# Patient Record
(demographics unavailable — no encounter records)

---

## 2024-10-08 NOTE — HISTORY OF PRESENT ILLNESS
[5] : 5 [Denies] : Denies [No] : No [Yes] : Yes [Declined] : Declined [Informed consent documented in EHR.] : Informed consent documented in EHR. [TB] : Tuberculosis screening [Hep acute panel] : Hepatitis acute panel [Total Hep B core AB] : total Hepatitis B Core antibody [de-identified] : LEFT SHOULDER [Left upper extremity] : Left upper extremity [24g] : 24g [Start Time: ___] : Medication Start Time: [unfilled] [End Time: ___] : Medication End Time: [unfilled] [Medication Name: ___] : Medication Name: [unfilled] [Total Amount Administered: ___] : Total Amount Administered: [unfilled] [IV discontinued. Intact. No signs or symptoms of IV complications noted. Time: ___] : IV discontinued. Intact. No signs or symptoms of IV complications noted. Time: [unfilled] [Patient  instructed to seek medical attention with signs and symptoms of adverse effects] : Patient  instructed to seek medical attention with signs and symptoms of adverse effects [Patient left unit in no acute distress] : Patient left unit in no acute distress [Medications administered as ordered and tolerated well.] : Medications administered as ordered and tolerated well. [de-identified] : 10:03AM [de-identified] : NEXT INFUSION- 11/5/24 AT 10:00AM

## 2024-11-05 NOTE — HISTORY OF PRESENT ILLNESS
[Denies] : Denies [No] : No [Yes] : Yes [Declined] : Declined [Informed consent documented in EHR.] : Informed consent documented in EHR. [TB] : Tuberculosis screening [Hep acute panel] : Hepatitis acute panel [Right upper extremity] : Right upper extremity [22g] : 22g [Start Time: ___] : Medication Start Time: [unfilled] [End Time: ___] : Medication End Time: [unfilled] [Medication Name: ___] : Medication Name: [unfilled] [Total Amount Administered: ___] : Total Amount Administered: [unfilled] [IV discontinued. Intact. No signs or symptoms of IV complications noted. Time: ___] : IV discontinued. Intact. No signs or symptoms of IV complications noted. Time: [unfilled] [Patient  instructed to seek medical attention with signs and symptoms of adverse effects] : Patient  instructed to seek medical attention with signs and symptoms of adverse effects [Patient left unit in no acute distress] : Patient left unit in no acute distress [Medications administered as ordered and tolerated well.] : Medications administered as ordered and tolerated well. [Blood drawn at time of visit] : Blood drawn at time of visit [de-identified] : 9:55am

## 2024-11-12 NOTE — DATA REVIEWED
[FreeTextEntry1] : Labs:  1/23 nl CMP, C3/4, ESR, UA, WBC 2.8, otherwise nl CBC, CRP 11 11/25/22 w/ TSH 15, dsDNA > 1000, ACL 1gM > 150, B2G IGA 57/ IGM > 150, phosphatidyl IGM 52 Chromatic 4.0, anti histone 4.1, +LAC (DRVVT only) persistent leukopenia 3.15 (high % neutrophils), nl IGG subsets, C3/4, CMP, CK UPCR 0.2, neg TPO/ TG, RF, ScL70, STAN, SSA/B, MPO, PR3, ANCA, ACE, CCP, IPEP, granulocyte antibodies and HCQ levels just above therapeutic range (should be > 1000- her value 1002)   Updated Labs: 2/22 low + DILLON 1:320H/N w/ low + dsDNA 13, + CAVi 39 (early SS antibody), nl C3/4 CK, SPEP, CBC  nl neg CCP, HLAB27, MADELIN, Scl70  1/22 low + DILLON 1:320H/N w/ low + dsDNA 13, C3 81 in past nl nl / C4 nl neg CCP, hep TPO, low + TG, B/C and QFT, vit D 26.   Diagnostics:  - US hands 3/22 negative for active synovitis but didn't have any swelling at the time of study! . MR knee L 10/21 mild thinning medial compartment otherwise neg   CTA chest 9/21 neg for PE, lymphadenopathy or ILD   CT Abd/ pelvis 9/21 gallbladder sludge neg otherwise-> US abd complete neg  Dexa 12/3/19: w/ most severe Tscore -1.5 at L2 w/ -1.4 at LFN with low risk fracture overall. No recent comparison available - when compared to 2011- 15% drop at spine..updated study needed now

## 2024-11-12 NOTE — PROCEDURE
[Today's Date:] : Date: [unfilled] [Risks] : risks [Benefits] : benefits [Alternatives] : alternatives [Consent Obtained] : written consent was obtained prior to the procedure and is detailed in the patient's record [Patient] : Prior to the start of the procedure a time out was taken and the identity of the patient was confirmed via name and date of birth with the patient. The correct site and the procedure to be performed were confirmed. The correct side was confirmed if applicable. The availability of the correct equipment was verified [Therapeutic] : therapeutic [#1 Site: ______] : #1 site identified in the [unfilled] [Alcohol] : alcohol [25 gauge 1 inch] : A 25 gauge 1 inch needle was used [Tolerated Well] : the patient tolerated the procedure well [No Complications] : there were no complications [Instructions Given] : handouts/patient instructions were given to patient [Patient Instructed to Call] : patient was instructed to call if redness at site, a decrease in range of motion or an increase in pain is noted after procedure. [de-identified] : 2cc 1% Lidocaine

## 2024-11-12 NOTE — REVIEW OF SYSTEMS
[Feeling Poorly] : feeling poorly [Feeling Tired] : feeling tired [Recent Weight Loss (___ Lbs)] : recent [unfilled] ~Ulb weight loss [Chest Pain] : chest pain [Heartburn] : heartburn [As Noted in HPI] : as noted in HPI [Arthralgias] : arthralgias [Joint Pain] : joint pain [Joint Swelling] : joint swelling [Joint Stiffness] : joint stiffness [Sleep Disturbances] : sleep disturbances [Anxiety] : anxiety [Depression] : depression [Negative] : Heme/Lymph [Fever] : no fever [Chills] : no chills [Dry Eyes] : no dryness of the eyes [Lower Ext Edema] : no lower extremity edema [Shortness Of Breath] : no shortness of breath [Cough] : no cough [Skin Lesions] : no skin lesions [Itching] : no itching [FreeTextEntry2] : marked improvement in energy [FreeTextEntry4] : Dry mouth r/t lyrica rx 2-3 tabs (q hs) with mouth guard routinely for bruxism- notes irritation cheilosis BL  [FreeTextEntry5] : Non-reproducible chest tightness, hx GERD on omeprazole. Previous: chest pain . L sided cw pain etiology unclear- intermittently pleuritic w/ hx pleural effusions- still painful.. routinely.- mild now [FreeTextEntry6] : Denies SOB, wheezing.  [FreeTextEntry7] :  On omeprazole daily. PREVIOUS VISIT: severe GERD and diarrhea w/ leflunomide- better OFF .   [FreeTextEntry8] : Slight increase in pro/cr ratio 0.3 since 11/23-> 3/24, asymptomatic- stable updated studies needed  [FreeTextEntry9] : Excellent lately... in past Intermittent b/l hand swelling, neuropathy and soreness, b/l shoulder pain. PREVIOUS VISIT: still getting intermittent swelling in hands and "periodic joint pain"- most severe in bl shoulder L>R.   [de-identified] : PREVIOUS VISIT: Itchy, blistering lesions; photosensitive arthralgias but no rash.- RESOLVED  [de-identified] : Mild intermittent paresthesias nothing today -STILL- minimal lately [de-identified] : Feels A/D better controlled on wellbutrin 300mg daily and improved w/ better sleep on current regimen [de-identified] : Stil on levothyroxine 150mg endo following TSH [id number], no recent TSH level in chart but pt report nl [de-identified] : RESOLVED -Lip swelling

## 2024-11-12 NOTE — PHYSICAL EXAM
[General Appearance - Alert] : alert [General Appearance - In No Acute Distress] : in no acute distress [General Appearance - Well Nourished] : well nourished [General Appearance - Well Developed] : well developed [General Appearance - Well-Appearing] : healthy appearing [Sclera] : the sclera and conjunctiva were normal [Outer Ear] : the ears and nose were normal in appearance [Oropharynx] : the oropharynx was normal [] : no respiratory distress [Respiration, Rhythm And Depth] : normal respiratory rhythm and effort [Exaggerated Use Of Accessory Muscles For Inspiration] : no accessory muscle use [Auscultation Breath Sounds / Voice Sounds] : lungs were clear to auscultation bilaterally [Heart Rate And Rhythm] : heart rate was normal and rhythm regular [Heart Sounds] : normal S1 and S2 [Heart Sounds Gallop] : no gallops [Murmurs] : no murmurs [Heart Sounds Pericardial Friction Rub] : no pericardial rub [Edema] : there was no peripheral edema [Cervical Lymph Nodes Enlarged Posterior Bilaterally] : posterior cervical [Cervical Lymph Nodes Enlarged Anterior Bilaterally] : anterior cervical [Supraclavicular Lymph Nodes Enlarged Bilaterally] : supraclavicular [Sensation] : the sensory exam was normal to light touch and pinprick [No Focal Deficits] : no focal deficits [Motor Exam] : the motor exam was normal [Oriented To Time, Place, And Person] : oriented to person, place, and time [Impaired Insight] : insight and judgment were intact [Affect] : the affect was normal [Abnormal Walk] : normal gait [Nail Clubbing] : no clubbing  or cyanosis of the fingernails [Musculoskeletal - Swelling] : no joint swelling seen [Motor Tone] : muscle strength and tone were normal [FreeTextEntry1] : Today no obvious synovitis;  Previous: Minimal swelling L 3rd MCP w/ mild ttp and L shoulder mild POM/ TTT but fROM

## 2024-11-12 NOTE — HISTORY OF PRESENT ILLNESS
[FreeTextEntry1] : 11/12/24  CC: SLE follow-up, spastic muscle pain, worst in neck Discomfort worse at night than morning Overall, patient says she's feeling better with treatment  Complains of throat pain 24 hours after infusion, lasting two days then resolves Sides of face up to temples become painful to touch Last infusion on Tuesday  Raynaud's very active, causes pain and numbness, painful when palpated Also complains of dry mouth - slightly moist but no pooling Other joints okay, "hips click a little"  Still taking 400mg Plaquenil, 300mg Bupropion, 100mg Pregabalin at night, 10mg Cyclobenzaprine at night No Prednisone  Reviewed labs C3, C4, ESR, CRP all basically stable  Bone Density 9/16/24 Most severe T-score -2.1 at spine, 6% decreased from 2yr ago -1.5 at femoral neck, 8% decreased FRAX 11%, 1.2% risk hip fracture    06/19/24 Finally better!! Switched to Benlysta infusions x 3 thus far and overall energy markedly improved, diffuse widespread pain better- and joint pain/ swelling and stiffness greatly improved as well.. no active rash, mucositis nearly fully resolved, though does still have marked dryness (feels worse with routine use of pregabalin) Continues HCQ and getting 40 mg solumedrol/ APAP and cetrizine with infusion- despite this does report some strange "tightness" in throat 12-24 hs after infusion, brief, not associated w/ overt inflammation- hoarsenss.. then resolves.  Otherwise tolerating Benlysta better than Orencia.    - sleeping better with low dose pregabalin/ and cyclobenzaprine 10 mg at HS consistently  - mild persistent CW pain- tolerable but little difference with tx above and not pleuritic at this point.  - no recent need for steroids..  - labs pending in next mnth.. but hasn't felt this well in years..  NOTE: -Triple infection +RSV/ Enterorhino/COVID 01/24 and poor sleep quality. with at times overwhelming fatigue .. still not back to baseline  -GYN dexa/mammogram scripts ordered, patient to complete (was this done??) -PCP cholesterol stable on 40mg atorvastatin daily -Avoids NSAIDs 2/2 +serologies, on aspirin daily  03/18/24 labs: CBC downtrending WBC, CRP 15, ESR 40, DsDNA >1000 neg/nl C3/4, CMP   1) SLE/ Inflammatory arthritis- RA dx years ago- seronegative 2) AI Thyroid disease 3) Secondary FM 4) Osteopenia ____________________________________________________________________________  Initial HPI 1/26/22 59 yo  Dx with SLE 30 ys ago.. presented at 28 with severe joint symmetrical inflammation x 6 m initially seen by Dr. Mock tx w/  mg x many ys (intermittently stops few mnths.. then recurrence)  Presented with widespread lymphadenopathy.. intermittently with flares... ?Protein initially  Raynauds x many ys  Intermittent need for steroids.. 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 No bleeding / clotting dyscrasias  Updated Labs: 1/22 low + DILLON 1:320H/N w/ low + dsDNA 13, C3 81 in past nl nl / C4 nl neg CCP...  TFTs intermittently elevated on replacement   Dry eyes natural once daily / mouth- worse w/ medication Night sweats:  drenching recently past few wks.. no lymphadenopathy but  Hair thinning- did ys ago patch alopecia.. Joints:  recurrent spontaneous joint inflammation in symmetrical (classic RA pattern - polyarticular severe) and asymmetrical pattern intraarticular synovitis and diffuse pauciarticular enthesopathies ( recurrent plantar fascitis, bursitis shoulders, lateral epicondylitis and patella tendinopathy)..  Mild pleuritic R sided x 15 intermittent.. pleural effusion.. more uncomfortable when flaring.. (chronic) No hx splenomegaly despite hx ITP.. (US this year - "sludgy).. no formal gallstones No renal dysfunction and nl UA no recurrent UTIs.. no hx protein Rash:  mild + photosensitive joint pain NO rash, no malar lesions or other.  Paresthesias:  fingers any time of 1st 2 fingers / and ball of foot..   Neck pain and stiffness intermittently.. w/ limited ROM intermittent  NO hx of inflammatory eye/ bowel disease  Knee pain chronically with OA noted on imaging moderate DJD    Secondary FM  IBS-D (last colonoscopy > 5 ys ago- next wk), mild GERD..   Depression: better, nearly nl and "happy", had been irritability currently well tried  Celexa in past x 6   Sleep: APAP PM.. Ambien in past .. rarely stays asleep 2/2 pain.. 3-4 hs rarely restorative..   Thyroid ca:  and AI thyroid disease, multinodular lesions, removed total resection and radiation x 1 .. now on titrating doses.  Working w/ Endo (Dr. KHAN).   Age appropriate malignancy screening:   colonoscopy > 5 ys / endoscopy -ys ago , mammogram- annually ok, pap smear 3 ys neg , CXR   c/o dry eyes, back pain, and N/T, hypothyroidism TSH 5.55 1/22  referred for + DILLON 1:320 H/R w/ low + dsDNA 13 and low C 3 x 1 81 (now nl) neg SSA/B, STAN no RF/  neg CCP, Hep B/C   + FH  IBD UC mother Thyroid dz

## 2024-12-03 NOTE — HISTORY OF PRESENT ILLNESS
[5] : 5 [Denies] : Denies [No] : No [Yes] : Yes [Declined] : Declined [Informed consent documented in EHR.] : Informed consent documented in EHR. [TB] : Tuberculosis screening [Hep acute panel] : Hepatitis acute panel [Total Hep B core AB] : total Hepatitis B Core antibody [Left upper extremity] : Left upper extremity [24g] : 24g [Start Time: ___] : Medication Start Time: [unfilled] [End Time: ___] : Medication End Time: [unfilled] [Medication Name: ___] : Medication Name: [unfilled] [Total Amount Administered: ___] : Total Amount Administered: [unfilled] [IV discontinued. Intact. No signs or symptoms of IV complications noted. Time: ___] : IV discontinued. Intact. No signs or symptoms of IV complications noted. Time: [unfilled] [Patient  instructed to seek medical attention with signs and symptoms of adverse effects] : Patient  instructed to seek medical attention with signs and symptoms of adverse effects [Patient left unit in no acute distress] : Patient left unit in no acute distress [Medications administered as ordered and tolerated well.] : Medications administered as ordered and tolerated well. [de-identified] : UPPER BODY  [de-identified] : 09:55AM [de-identified] : NEXT INFUSION- 1/7/25 AT 11:30AM

## 2025-01-07 NOTE — HISTORY OF PRESENT ILLNESS
[4] : 4 [N/A] : N/A [Denies] : Denies [No] : No [Yes] : Yes [Declined] : Declined [Informed consent documented in EHR.] : Informed consent documented in EHR. [TB] : Tuberculosis screening [Hep acute panel] : Hepatitis acute panel [Total Hep B core AB] : total Hepatitis B Core antibody [de-identified] : b/l shoulders down to arms [Left upper extremity] : Left upper extremity [24g] : 24g [Start Time: ___] : Medication Start Time: [unfilled] [End Time: ___] : Medication End Time: [unfilled] [Medication Name: ___] : Medication Name: [unfilled] [Total Amount Administered: ___] : Total Amount Administered: [unfilled] [IV discontinued. Intact. No signs or symptoms of IV complications noted. Time: ___] : IV discontinued. Intact. No signs or symptoms of IV complications noted. Time: [unfilled] [Patient  instructed to seek medical attention with signs and symptoms of adverse effects] : Patient  instructed to seek medical attention with signs and symptoms of adverse effects [Patient left unit in no acute distress] : Patient left unit in no acute distress [Medications administered as ordered and tolerated well.] : Medications administered as ordered and tolerated well. [de-identified] : 11:30AM [de-identified] : NEXT INFUSION- 2/4/25 AT 09:30AM

## 2025-01-16 NOTE — PHYSICAL EXAM
[Midline] : trachea located in midline position [Normal] : no rashes [] : septum deviated to the left [de-identified] : Exposed vessel of left anterior septum.

## 2025-01-16 NOTE — HISTORY OF PRESENT ILLNESS
[de-identified] : Zenobia Whitney is a 63 year old female on hx of ITP aspirin who presents after ED evaluation of left sided epistasis. She had an unprompted left sided epistaxis that lasted for 2 hours which prompted ED evaluation at [facility] 1/14. She states that she had clots from nose yesterday. She denies nasal trauma. She denies lightheadedness, fatigue, or dyspnea. She denies nasal obstruction. She denies nasal congestion. She states she has chronic left sided clear rhinorrhea. She states she had sinus pressure on left side a few days before epistaxis. She has not had allergy testing in the past and denies flonase use. Has used Afrin as per ED.  ED notes reviewed. Previous labs reviewed. 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

## 2025-01-16 NOTE — ASSESSMENT
[FreeTextEntry1] : Zenobia Whitney is a 63 year old female on hx of ITP aspirin who presents after ED evaluation of left sided epistasis. ED notes reviewed. Previous labs showed normal platelet count. On exam, she has septal deviation to left with spur and prominent left septal vessel, which was cauterized with silver nitrate. Epistaxis precautions were provided:  Prevention of epistaxis: 1. The goal is to maintain good hydration of the lining of the nose. Dryness inside the nose is a major cause of bleeding. 2. Use 2-3 sprays of nasal saline to both sides of nose several times daily. 3. Apply small amount of antibiotic ointment to lining of the front of nose daily. 4. Avoid the use of ceiling fans or blowing air which can dry out your nose. 5. Consider use of a humidifier in the bedroom. 6. Avoid vigorous nose blowing. Cough and sneeze with your mouth open. 7. Your doctor may recommend stopping medicated nasal sprays for allergy or sinusitis, as these can sometimes worsen nosebleeds. 8. Maintain good control of your blood pressure. 9. If you are taking blood thinning medications, maintain careful follow-up with your prescribing physician to make sure these are properly dosed.  What to do if you have a nosebleed: 1. Use 2-3 sprays of topical nasal decongestant (ex: oxymetazoline, Afrin) on each side of the nose. 2. Hold firm pressure to soft part of nose for at least 5 minutes. Repeat as needed. 3. If bleeding is severe or does not resolve with these directions, seek immediate medical care.  - f/u in 1 mo

## 2025-02-04 NOTE — HISTORY OF PRESENT ILLNESS
[6] : 6 [N/A] : N/A [Denies] : Denies [No] : No [Yes] : Yes [Declined] : Declined [TB] : Tuberculosis screening [Hep acute panel] : Hepatitis acute panel [de-identified] : neck area [de-identified] : Dr. Bibiana Daniels was the supervising provider for today's infusion [Right upper extremity] : Right upper extremity [24g] : 24g [Start Time: ___] : Medication Start Time: [unfilled] [End Time: ___] : Medication End Time: [unfilled] [Medication Name: ___] : Medication Name: [unfilled] [Total Amount Administered: ___] : Total Amount Administered: [unfilled] [IV discontinued. Intact. No signs or symptoms of IV complications noted. Time: ___] : IV discontinued. Intact. No signs or symptoms of IV complications noted. Time: [unfilled] [Patient  instructed to seek medical attention with signs and symptoms of adverse effects] : Patient  instructed to seek medical attention with signs and symptoms of adverse effects [Patient left unit in no acute distress] : Patient left unit in no acute distress [Medications administered as ordered and tolerated well.] : Medications administered as ordered and tolerated well. [de-identified] : 09:30 am

## 2025-02-17 NOTE — REVIEW OF SYSTEMS
[Feeling Poorly] : feeling poorly [Feeling Tired] : feeling tired [Recent Weight Loss (___ Lbs)] : recent [unfilled] ~Ulb weight loss [Chest Pain] : chest pain [Heartburn] : heartburn [As Noted in HPI] : as noted in HPI [Arthralgias] : arthralgias [Joint Pain] : joint pain [Joint Swelling] : joint swelling [Joint Stiffness] : joint stiffness [Sleep Disturbances] : sleep disturbances [Anxiety] : anxiety [Depression] : depression [Negative] : Heme/Lymph [Fever] : no fever [Chills] : no chills [Dry Eyes] : no dryness of the eyes [Lower Ext Edema] : no lower extremity edema [Shortness Of Breath] : no shortness of breath [Cough] : no cough [Skin Lesions] : no skin lesions [Itching] : no itching [FreeTextEntry2] : marked improvement in energy [FreeTextEntry4] : Dry mouth r/t lyrica rx 2-3 tabs (q hs) with mouth guard routinely for bruxism- notes irritation cheilosis BL  [FreeTextEntry5] : Non-reproducible chest tightness, hx GERD on omeprazole. Previous: chest pain . L sided cw pain etiology unclear- intermittently pleuritic w/ hx pleural effusions- still painful.. routinely.- mild now [FreeTextEntry6] : Denies SOB, wheezing.  [FreeTextEntry7] :  On omeprazole daily. PREVIOUS VISIT: severe GERD and diarrhea w/ leflunomide- better OFF .   [FreeTextEntry8] : Slight increase in pro/cr ratio 0.3 since 11/23-> 3/24, asymptomatic- stable updated studies needed  [FreeTextEntry9] : Excellent lately... in past Intermittent b/l hand swelling, neuropathy and soreness, b/l shoulder pain. PREVIOUS VISIT: still getting intermittent swelling in hands and "periodic joint pain"- most severe in bl shoulder L>R.   [de-identified] : PREVIOUS VISIT: Itchy, blistering lesions; photosensitive arthralgias but no rash.- RESOLVED  [de-identified] : Mild intermittent paresthesias nothing today -STILL- minimal lately [de-identified] : Feels A/D better controlled on wellbutrin 300mg daily and improved w/ better sleep on current regimen [de-identified] : RESOLVED -Lip swelling [de-identified] : Stil on levothyroxine 150mg endo following TSH [id number], no recent TSH level in chart but pt report nl

## 2025-02-17 NOTE — PHYSICAL EXAM
[General Appearance - Alert] : alert [General Appearance - In No Acute Distress] : in no acute distress [General Appearance - Well Nourished] : well nourished [General Appearance - Well Developed] : well developed [General Appearance - Well-Appearing] : healthy appearing [Sclera] : the sclera and conjunctiva were normal [Outer Ear] : the ears and nose were normal in appearance [Oropharynx] : the oropharynx was normal [] : no respiratory distress [Respiration, Rhythm And Depth] : normal respiratory rhythm and effort [Exaggerated Use Of Accessory Muscles For Inspiration] : no accessory muscle use [Auscultation Breath Sounds / Voice Sounds] : lungs were clear to auscultation bilaterally [Heart Rate And Rhythm] : heart rate was normal and rhythm regular [Heart Sounds] : normal S1 and S2 [Heart Sounds Gallop] : no gallops [Murmurs] : no murmurs [Heart Sounds Pericardial Friction Rub] : no pericardial rub [Edema] : there was no peripheral edema [Cervical Lymph Nodes Enlarged Posterior Bilaterally] : posterior cervical [Cervical Lymph Nodes Enlarged Anterior Bilaterally] : anterior cervical [Supraclavicular Lymph Nodes Enlarged Bilaterally] : supraclavicular [Sensation] : the sensory exam was normal to light touch and pinprick [Motor Exam] : the motor exam was normal [No Focal Deficits] : no focal deficits [Oriented To Time, Place, And Person] : oriented to person, place, and time [Impaired Insight] : insight and judgment were intact [Affect] : the affect was normal [Abnormal Walk] : normal gait [Nail Clubbing] : no clubbing  or cyanosis of the fingernails [Musculoskeletal - Swelling] : no joint swelling seen [Motor Tone] : muscle strength and tone were normal [FreeTextEntry1] : Today no obvious synovitis;  Previous: Minimal swelling L 3rd MCP w/ mild ttp and L shoulder mild POM/ TTT but fROM

## 2025-02-17 NOTE — HISTORY OF PRESENT ILLNESS
[FreeTextEntry1] : 2/12/25 CC:  Still #1 issue chest wall pain.. worse with deep insp.. and with steroids upon monthly infusion absolutely notes improvement.. has not routinely tried NSAIDs..  oral dryness is worse with cyclobenzaprine, has not tried cevimiline.. using topical agents with some minimial  Continues with Benlysta and HCQ tolerating both without issues. No longer strange sensation with infusions.. but has had to continue medrol.. to minimize  ++ response  No recent UTI now using estradial few time/s wk Continues wellbutrin 300 mgand pregabalin low dose usually 50 mg at HS (#120 lasted nearly 6 mg) usually at HS tolerated fairly well Most recent labs 1/7/25 with ESR 33 (stable), CRP 7, nl CBC- WBC stable at 2.27, CMP, nl UA/ UPCR (max  0.3), now C 3 nl (from low 80) and C4 and dsDNA excellent now- had been very high.  - trigger point an last visit 11/24 ++ response - not active issue    1) SLE/ Inflammatory arthritis- RA dx years ago- seronegative 2) AI Thyroid disease 3) Secondary FM 4) Osteopenia ____________________________________________________________________________  Initial HPI 1/26/22 59 yo  Dx with SLE 30 ys ago.. presented at 28 with severe joint symmetrical inflammation x 6 m initially seen by Dr. Mock tx w/  mg x many ys (intermittently stops few mnths.. then recurrence)  Presented with widespread lymphadenopathy.. intermittently with flares... ?Protein initially  Raynauds x many ys  Intermittent need for steroids.. 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 No bleeding / clotting dyscrasias  Updated Labs: 1/22 low + DILLON 1:320H/N w/ low + dsDNA 13, C3 81 in past nl nl / C4 nl neg CCP...  TFTs intermittently elevated on replacement   Dry eyes natural once daily / mouth- worse w/ medication Night sweats:  drenching recently past few wks.. no lymphadenopathy but  Hair thinning- did ys ago patch alopecia.. Joints:  recurrent spontaneous joint inflammation in symmetrical (classic RA pattern - polyarticular severe) and asymmetrical pattern intraarticular synovitis and diffuse pauciarticular enthesopathies ( recurrent plantar fascitis, bursitis shoulders, lateral epicondylitis and patella tendinopathy)..  Mild pleuritic R sided x 15 intermittent.. pleural effusion.. more uncomfortable when flaring.. (chronic) No hx splenomegaly despite hx ITP.. (US this year - "sludgy).. no formal gallstones No renal dysfunction and nl UA no recurrent UTIs.. no hx protein Rash:  mild + photosensitive joint pain NO rash, no malar lesions or other.  Paresthesias:  fingers any time of 1st 2 fingers / and ball of foot..   Neck pain and stiffness intermittently.. w/ limited ROM intermittent  NO hx of inflammatory eye/ bowel disease  Knee pain chronically with OA noted on imaging moderate DJD    Secondary FM  IBS-D (last colonoscopy > 5 ys ago- next wk), mild GERD..   Depression: better, nearly nl and "happy", had been irritability currently well tried  Celexa in past x 6   Sleep: APAP PM.. Ambien in past .. rarely stays asleep 2/2 pain.. 3-4 hs rarely restorative..   Thyroid ca:  and AI thyroid disease, multinodular lesions, removed total resection and radiation x 1 .. now on titrating doses.  Working w/ Endo (Dr. KHAN).   Age appropriate malignancy screening:   colonoscopy > 5 ys / endoscopy -ys ago , mammogram- annually ok, pap smear 3 ys neg , CXR   c/o dry eyes, back pain, and N/T, hypothyroidism TSH 5.55 1/22  referred for + DILLON 1:320 H/R w/ low + dsDNA 13 and low C 3 x 1 81 (now nl) neg SSA/B, STAN no RF/  neg CCP, Hep B/C   + FH  IBD UC mother Thyroid dz

## 2025-02-17 NOTE — ASSESSMENT
[FreeTextEntry1] : 63 yo wf w/ hx of thyroid ca s/p thyroidectomy, Dx with SLE / RUPUS 30 ys ago, depression and secondary FM and hx of vestibular schwannoma (- 2 mm stable- v small)  -  COVID infection:  severe URI.. resolved w/ no sequelae.. actually all pain resolved for 2-3 wks following this infection and last infection few ys ago. Presents today reassessment Orencia infusion new dose 500mg 24 -Started Orencia  but allergic reactions.. eventually switched..  - started Benlysta   -  acute epistaxis..   1) SLE w/ prominent symmetrical synovitis: : + DILLON 1:320, low + dsDNA > 1000, low complements (recently nl), + CAVi 39 (early Sjogrens) + Chromatin 4.0, antihistone 4.1, + LAC by DRVVT and ACL IGM > 150, B2G IGM > 150, phosphatidyl serine IGM 52.. (no reported bleeding/ clotting dyscrasia) but caution NSAIDs w/ neg RF/ CCP and all other subserologies to include ACE/ ANCA/ PR3/MPO.. - Presented at age 28 with severe joint symmetrical inflammation, RP, lymphadenopathy (mild intermittent), sicca ITP (severe x 2 episodes), hypercoagulability (dx 2018), hair thinning, and proteinuria..  - Recurrent synovitis (symmetrical/ asymmetrical)/ enthesopathies (asymmetrical)- severe pain/ swelling still b/l hands R wrist considerably when active Finally for first time in over 12 m well controlled w/ no synovitis peripherally, TMJ better w/  mg (5.3 mg/kg- levels 1002- therapeutic is over 1000) x many ys. and on Benlysta infusions since .. doing VERY well today.  Notes some "tightness"  in throat 12-24 hs after infusion but no hoarsenss/ / cough- resolved now, tolerating infusions better but because of these reactions, still getting solumedrol 40 mg before monthly infusions.. always feels better .. no longer requiring po steroids following infusions.  Still with CW pain/ pleuritic in nature, improves with steroids- has not recently tried NSAIDs- encouraged to consider meloxicam 7.5 mg daily- 2nd dose if needed.. after failing to improve with colchicine (not effective or tolerated) Other options: Could also consider Saphnelo, RTX but only if no improvement Benlysta.   If need to add additional therapy would be cautious with AZA given chronic leukopenia) NOTE: - Orencia autoinjector +response but not covered by insurance- though well tolerated and effective but not covered - infusions associated with strange infusion reactions.. Orencia  - Chronic atypical CP:  echo  trace pericardial effusion as noted on CTscan (prior to starting Orencia) CTscan chest  + small pericardial effusion, pericardial thickening and small loculated L pleural effusion/ thickening; Colchicine at any dose not effective and not tolerated! Severe GI distress upper / lower - In recent past tried both MTX/ and LEF didn't tolerate either.. severe GI distress, and MTX associated w/ oral sicca possibly (per pt) - meloxicam tolerated in past.. ibuprofen tolerated- both.. other NSAIDS not tolerated well  - Seen by Dr Hightower and research team  but not eligible 2/2 hx thyroid ca - Intermittent need for steroids.. always w/ + response... but doesn't like how feels, only really tolerates 5 mg daily.. (10 mg temp insensitivity, trouble sleeping, restless) - MTX and LEF associated iwth severe GI distress. oral therapy. 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 No bleeding / clotting dyscrasias. No miscarriage  (c sections)- chronic leukopenia - No hx splenomegaly despite hx ITP.. (US this year - "sludgy).. no formal gallstones - Knee pain chronically with OA noted on imaging moderate DJD  2) AI thyroid disease, thyroid cancer s/p thyroidectomy: Hypothyroidism on replacement TFTs intermittently elevated TSH.. on replacement recently adjusted dose when TSH 15 ()... Working w/ Endo (Dr KHAN) reportedly updated study nl  3) FM, secondary: Dx > 10 ys ago. Doing well on current regimen of wellbutrin 300 mg and better sleep with Lyrica 100 mg at HS and cyclobenzaprine 10 mg every night.  If continues to do well, will try to lower pharm burden slowly.. as tolerated.   - Sleep/TMJ: Now more consistent use Flexeril 10mg, lyrica  mg (usually 50 mg) at HS and MM w/ marked  improvement in sleep. Very c/w mouth guard- but notes dryness and irritation cw/ angular cheilosis (now resolved) but sicca persists.  Didn't try Cevimiline, will do so now reminder PRN dosing.. this is NOT necessary 3 x day.  Continues w/ B complex supplements as well along with topical sialogogues..   - Myalgias/Weakness: marked improvement with better control of SLE, no obvious trigger points today - Paresthesias: intermittent, improved with Lyrica low dose  - IBS-D (last colonoscopy > 5 ys ago- next wk), mild GERD initially, worse with MTX and even worse w/ LEF and colchicine, better now off all these. Not active at this time- and remains off all of these agents  - Mood: marked improvement with control of underlying condition. NOTE Celexa in past x 6 m not effective Ambien: effective but logistical issues Naproxen / Feldene: not tolerated severe memory issues Gabapentin in past can't remember efficacy or SE/ Lyrica tolerated at low doses but severe sicca 100 mg.. can't increase   4) Osteopenia: Bone Density 24 with most severe T-score -2.1 at spine, 6% decreased from 2yr ago -1.5 at femoral neck, 8% decreased... on no treatment.   FRAX 11%, 1.2% risk hip fracture- VFA not evaluated  Denies fractures, no previous treatment  5) HA: w/ dizziness better lately.. Small AN- 2 mm diameter, following by neuro..   TMJ ongoing pain, crepitus.. better with treatment above for SLE now... - working w/ Dr Cordova - MR brain 3/23 stable->  still stable  + for acoustic Schwannoma, asymptomatic not thought to be reason for HA - Confirmed TMJ and wearing mouth piece  now more consistent   6) Rash:  vesicular lesions diffuse oral when experienced triple+ infection COVID/Enterorhinovirus/RSV now resolved.. > 12 m later with no recurrence.   Age appropriate malignancy screening: colonoscopy > / endoscopy   neg (hemorrhoids small and diverticula)  , mammogram- GYN ordered update, pap smear 3 ys neg , CXR   Plan 25  - doing well, stable regimen at this point   - Give Tizanidine prescription, return to 10mg Cyclobenzaprine if no longer works- PRN   - Hold Lyrica to see if it improves mouth dryness- low dose continue as needed ..   - Give 1mo's worth of trial Cevimeline (use up to 3x per day- PRN) for mouth dryness, needs to try this   - meloxicam as needed 7.5 mg qd or BID..   - Follow-up in 4 m  -Is aware to call if there is any change in her underlying symptoms.

## 2025-02-17 NOTE — REVIEW OF SYSTEMS
[Feeling Poorly] : feeling poorly [Feeling Tired] : feeling tired [Recent Weight Loss (___ Lbs)] : recent [unfilled] ~Ulb weight loss [Chest Pain] : chest pain [Heartburn] : heartburn [As Noted in HPI] : as noted in HPI [Arthralgias] : arthralgias [Joint Pain] : joint pain [Joint Swelling] : joint swelling [Joint Stiffness] : joint stiffness [Sleep Disturbances] : sleep disturbances [Anxiety] : anxiety [Depression] : depression [Negative] : Heme/Lymph [Fever] : no fever [Chills] : no chills [Dry Eyes] : no dryness of the eyes [Lower Ext Edema] : no lower extremity edema [Shortness Of Breath] : no shortness of breath [Cough] : no cough [Skin Lesions] : no skin lesions [Itching] : no itching [FreeTextEntry2] : marked improvement in energy [FreeTextEntry4] : Dry mouth r/t lyrica rx 2-3 tabs (q hs) with mouth guard routinely for bruxism- notes irritation cheilosis BL  [FreeTextEntry5] : Non-reproducible chest tightness, hx GERD on omeprazole. Previous: chest pain . L sided cw pain etiology unclear- intermittently pleuritic w/ hx pleural effusions- still painful.. routinely.- mild now [FreeTextEntry6] : Denies SOB, wheezing.  [FreeTextEntry7] :  On omeprazole daily. PREVIOUS VISIT: severe GERD and diarrhea w/ leflunomide- better OFF .   [FreeTextEntry8] : Slight increase in pro/cr ratio 0.3 since 11/23-> 3/24, asymptomatic- stable updated studies needed  [FreeTextEntry9] : Excellent lately... in past Intermittent b/l hand swelling, neuropathy and soreness, b/l shoulder pain. PREVIOUS VISIT: still getting intermittent swelling in hands and "periodic joint pain"- most severe in bl shoulder L>R.   [de-identified] : PREVIOUS VISIT: Itchy, blistering lesions; photosensitive arthralgias but no rash.- RESOLVED  [de-identified] : Mild intermittent paresthesias nothing today -STILL- minimal lately [de-identified] : Feels A/D better controlled on wellbutrin 300mg daily and improved w/ better sleep on current regimen [de-identified] : Stil on levothyroxine 150mg endo following TSH [id number], no recent TSH level in chart but pt report nl [de-identified] : RESOLVED -Lip swelling

## 2025-02-17 NOTE — REVIEW OF SYSTEMS
[Feeling Poorly] : feeling poorly [Feeling Tired] : feeling tired [Recent Weight Loss (___ Lbs)] : recent [unfilled] ~Ulb weight loss [Chest Pain] : chest pain [Heartburn] : heartburn [As Noted in HPI] : as noted in HPI [Arthralgias] : arthralgias [Joint Pain] : joint pain [Joint Swelling] : joint swelling [Joint Stiffness] : joint stiffness [Sleep Disturbances] : sleep disturbances [Anxiety] : anxiety [Depression] : depression [Negative] : Heme/Lymph [Fever] : no fever [Chills] : no chills [Dry Eyes] : no dryness of the eyes [Lower Ext Edema] : no lower extremity edema [Shortness Of Breath] : no shortness of breath [Cough] : no cough [Skin Lesions] : no skin lesions [Itching] : no itching [FreeTextEntry2] : marked improvement in energy [FreeTextEntry4] : Dry mouth r/t lyrica rx 2-3 tabs (q hs) with mouth guard routinely for bruxism- notes irritation cheilosis BL  [FreeTextEntry5] : Non-reproducible chest tightness, hx GERD on omeprazole. Previous: chest pain . L sided cw pain etiology unclear- intermittently pleuritic w/ hx pleural effusions- still painful.. routinely.- mild now [FreeTextEntry6] : Denies SOB, wheezing.  [FreeTextEntry7] :  On omeprazole daily. PREVIOUS VISIT: severe GERD and diarrhea w/ leflunomide- better OFF .   [FreeTextEntry8] : Slight increase in pro/cr ratio 0.3 since 11/23-> 3/24, asymptomatic- stable updated studies needed  [FreeTextEntry9] : Excellent lately... in past Intermittent b/l hand swelling, neuropathy and soreness, b/l shoulder pain. PREVIOUS VISIT: still getting intermittent swelling in hands and "periodic joint pain"- most severe in bl shoulder L>R.   [de-identified] : PREVIOUS VISIT: Itchy, blistering lesions; photosensitive arthralgias but no rash.- RESOLVED  [de-identified] : Mild intermittent paresthesias nothing today -STILL- minimal lately [de-identified] : Feels A/D better controlled on wellbutrin 300mg daily and improved w/ better sleep on current regimen [de-identified] : RESOLVED -Lip swelling [de-identified] : Stil on levothyroxine 150mg endo following TSH [id number], no recent TSH level in chart but pt report nl

## 2025-02-17 NOTE — REVIEW OF SYSTEMS
[Feeling Poorly] : feeling poorly [Feeling Tired] : feeling tired [Recent Weight Loss (___ Lbs)] : recent [unfilled] ~Ulb weight loss [Chest Pain] : chest pain [Heartburn] : heartburn [As Noted in HPI] : as noted in HPI [Arthralgias] : arthralgias [Joint Pain] : joint pain [Joint Swelling] : joint swelling [Joint Stiffness] : joint stiffness [Sleep Disturbances] : sleep disturbances [Anxiety] : anxiety [Depression] : depression [Negative] : Heme/Lymph [Fever] : no fever [Chills] : no chills [Dry Eyes] : no dryness of the eyes [Lower Ext Edema] : no lower extremity edema [Shortness Of Breath] : no shortness of breath [Cough] : no cough [Skin Lesions] : no skin lesions [Itching] : no itching [FreeTextEntry2] : marked improvement in energy [FreeTextEntry4] : Dry mouth r/t lyrica rx 2-3 tabs (q hs) with mouth guard routinely for bruxism- notes irritation cheilosis BL  [FreeTextEntry5] : Non-reproducible chest tightness, hx GERD on omeprazole. Previous: chest pain . L sided cw pain etiology unclear- intermittently pleuritic w/ hx pleural effusions- still painful.. routinely.- mild now [FreeTextEntry6] : Denies SOB, wheezing.  [FreeTextEntry7] :  On omeprazole daily. PREVIOUS VISIT: severe GERD and diarrhea w/ leflunomide- better OFF .   [FreeTextEntry8] : Slight increase in pro/cr ratio 0.3 since 11/23-> 3/24, asymptomatic- stable updated studies needed  [FreeTextEntry9] : Excellent lately... in past Intermittent b/l hand swelling, neuropathy and soreness, b/l shoulder pain. PREVIOUS VISIT: still getting intermittent swelling in hands and "periodic joint pain"- most severe in bl shoulder L>R.   [de-identified] : PREVIOUS VISIT: Itchy, blistering lesions; photosensitive arthralgias but no rash.- RESOLVED  [de-identified] : Mild intermittent paresthesias nothing today -STILL- minimal lately [de-identified] : Feels A/D better controlled on wellbutrin 300mg daily and improved w/ better sleep on current regimen [de-identified] : Stil on levothyroxine 150mg endo following TSH [id number], no recent TSH level in chart but pt report nl [de-identified] : RESOLVED -Lip swelling

## 2025-02-17 NOTE — HISTORY OF PRESENT ILLNESS
[FreeTextEntry1] : 2/12/25 CC:  Still #1 issue chest wall pain.. worse with deep insp.. and with steroids upon monthly infusion absolutely notes improvement.. has not routinely tried NSAIDs..  oral dryness is worse with cyclobenzaprine, has not tried cevimiline.. using topical agents with some minimial  Continues with Benlysta and HCQ tolerating both without issues. No longer strange sensation with infusions.. but has had to continue medrol.. to minimize  ++ response  No recent UTI now using estradial few time/s wk Continues wellbutrin 300 mgand pregabalin low dose usually 50 mg at HS (#120 lasted nearly 6 mg) usually at HS tolerated fairly well Most recent labs 1/7/25 with ESR 33 (stable), CRP 7, nl CBC- WBC stable at 2.27, CMP, nl UA/ UPCR (max  0.3), now C 3 nl (from low 80) and C4 and dsDNA excellent now- had been very high.  - trigger point na last visit 11/24 ++ response - not active issue    1) SLE/ Inflammatory arthritis- RA dx years ago- seronegative 2) AI Thyroid disease 3) Secondary FM 4) Osteopenia ____________________________________________________________________________  Initial HPI 1/26/22 59 yo  Dx with SLE 30 ys ago.. presented at 28 with severe joint symmetrical inflammation x 6 m initially seen by Dr. Mock tx w/  mg x many ys (intermittently stops few mnths.. then recurrence)  Presented with widespread lymphadenopathy.. intermittently with flares... ?Protein initially  Raynauds x many ys  Intermittent need for steroids.. 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 No bleeding / clotting dyscrasias  Updated Labs: 1/22 low + DILLON 1:320H/N w/ low + dsDNA 13, C3 81 in past nl nl / C4 nl neg CCP...  TFTs intermittently elevated on replacement   Dry eyes natural once daily / mouth- worse w/ medication Night sweats:  drenching recently past few wks.. no lymphadenopathy but  Hair thinning- did ys ago patch alopecia.. Joints:  recurrent spontaneous joint inflammation in symmetrical (classic RA pattern - polyarticular severe) and asymmetrical pattern intraarticular synovitis and diffuse pauciarticular enthesopathies ( recurrent plantar fascitis, bursitis shoulders, lateral epicondylitis and patella tendinopathy)..  Mild pleuritic R sided x 15 intermittent.. pleural effusion.. more uncomfortable when flaring.. (chronic) No hx splenomegaly despite hx ITP.. (US this year - "sludgy).. no formal gallstones No renal dysfunction and nl UA no recurrent UTIs.. no hx protein Rash:  mild + photosensitive joint pain NO rash, no malar lesions or other.  Paresthesias:  fingers any time of 1st 2 fingers / and ball of foot..   Neck pain and stiffness intermittently.. w/ limited ROM intermittent  NO hx of inflammatory eye/ bowel disease  Knee pain chronically with OA noted on imaging moderate DJD    Secondary FM  IBS-D (last colonoscopy > 5 ys ago- next wk), mild GERD..   Depression: better, nearly nl and "happy", had been irritability currently well tried  Celexa in past x 6   Sleep: APAP PM.. Ambien in past .. rarely stays asleep 2/2 pain.. 3-4 hs rarely restorative..   Thyroid ca:  and AI thyroid disease, multinodular lesions, removed total resection and radiation x 1 .. now on titrating doses.  Working w/ Endo (Dr. KHAN).   Age appropriate malignancy screening:   colonoscopy > 5 ys / endoscopy -ys ago , mammogram- annually ok, pap smear 3 ys neg , CXR   c/o dry eyes, back pain, and N/T, hypothyroidism TSH 5.55 1/22  referred for + DILLON 1:320 H/R w/ low + dsDNA 13 and low C 3 x 1 81 (now nl) neg SSA/B, STAN no RF/  neg CCP, Hep B/C   + FH  IBD UC mother Thyroid dz

## 2025-02-17 NOTE — ASSESSMENT
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 resolved w/ no sequelae.. actually all pain resolved for 2-3 wks following this infection and last infection few ys ago. Presents today reassessment Orencia infusion new dose 500mg 24 -Started Orencia  but allergic reactions.. eventually switched..  - started Benlysta   -  acute epistaxis..   1) SLE w/ prominent symmetrical synovitis: : + DILLON 1:320, low + dsDNA > 1000, low complements (recently nl), + CAVi 39 (early Sjogrens) + Chromatin 4.0, antihistone 4.1, + LAC by DRVVT and ACL IGM > 150, B2G IGM > 150, phosphatidyl serine IGM 52.. (no reported bleeding/ clotting dyscrasia) but caution NSAIDs w/ neg RF/ CCP and all other subserologies to include ACE/ ANCA/ PR3/MPO.. - Presented at age 28 with severe joint symmetrical inflammation, RP, lymphadenopathy (mild intermittent), sicca ITP (severe x 2 episodes), hypercoagulability (dx 2018), hair thinning, and proteinuria..  - Recurrent synovitis (symmetrical/ asymmetrical)/ enthesopathies (asymmetrical)- severe pain/ swelling still b/l hands R wrist considerably when active Finally for first time in over 12 m well controlled w/ no synovitis peripherally, TMJ better w/  mg (5.3 mg/kg- levels 1002- therapeutic is over 1000) x many ys. and on Benlysta infusions since .. doing VERY well today.  Notes some "tightness"  in throat 12-24 hs after infusion but no hoarsenss/ / cough- resolved now, tolerating infusions better but because of these reactions, still getting solumedrol 40 mg before monthly infusions.. always feels better .. no longer requiring po steroids following infusions.  Still with CW pain/ pleuritic in nature, improves with steroids- has not recently tried NSAIDs- encouraged to consider meloxicam 7.5 mg daily- 2nd dose if needed.. after failing to improve with colchicine (not effective or tolerated) Other options: Could also consider Saphnelo, RTX but only if no improvement Benlysta.   If need to add additional therapy would be cautious with AZA given chronic leukopenia) NOTE: - Orencia autoinjector +response but not covered by insurance- though well tolerated and effective but not covered - infusions associated with strange infusion reactions.. Orencia  - Chronic atypical CP:  echo  trace pericardial effusion as noted on CTscan (prior to starting Orencia) CTscan chest  + small pericardial effusion, pericardial thickening and small loculated L pleural effusion/ thickening; Colchicine at any dose not effective and not tolerated! Severe GI distress upper / lower - In recent past tried both MTX/ and LEF didn't tolerate either.. severe GI distress, and MTX associated w/ oral sicca possibly (per pt) - meloxicam tolerated in past.. ibuprofen tolerated- both.. other NSAIDS not tolerated well  - Seen by Dr Hightower and research team  but not eligible 2/2 hx thyroid ca - Intermittent need for steroids.. always w/ + response... but doesn't like how feels, only really tolerates 5 mg daily.. (10 mg temp insensitivity, trouble sleeping, restless) - MTX and LEF associated iwth severe GI distress. oral therapy. 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 No bleeding / clotting dyscrasias. No miscarriage  (c sections)- chronic leukopenia - No hx splenomegaly despite hx ITP.. (US this year - "sludgy).. no formal gallstones - Knee pain chronically with OA noted on imaging moderate DJD  2) AI thyroid disease, thyroid cancer s/p thyroidectomy: Hypothyroidism on replacement TFTs intermittently elevated TSH.. on replacement recently adjusted dose when TSH 15 ()... Working w/ Endo (Dr KHAN) reportedly updated study nl  3) FM, secondary: Dx > 10 ys ago. Doing well on current regimen of wellbutrin 300 mg and better sleep with Lyrica 100 mg at HS and cyclobenzaprine 10 mg every night.  If continues to do well, will try to lower pharm burden slowly.. as tolerated.   - Sleep/TMJ: Now more consistent use Flexeril 10mg, lyrica  mg (usually 50 mg) at HS and MM w/ marked  improvement in sleep. Very c/w mouth guard- but notes dryness and irritation cw/ angular cheilosis (now resolved) but sicca persists.  Didn't try Cevimiline, will do so now reminder PRN dosing.. this is NOT necessary 3 x day.  Continues w/ B complex supplements as well along with topical sialogogues..   - Myalgias/Weakness: marked improvement with better control of SLE, no obvious trigger points today - Paresthesias: intermittent, improved with Lyrica low dose  - IBS-D (last colonoscopy > 5 ys ago- next wk), mild GERD initially, worse with MTX and even worse w/ LEF and colchicine, better now off all these. Not active at this time- and remains off all of these agents  - Mood: marked improvement with control of underlying condition. NOTE Celexa in past x 6 m not effective Ambien: effective but logistical issues Naproxen / Feldene: not tolerated severe memory issues Gabapentin in past can't remember efficacy or SE/ Lyrica tolerated at low doses but severe sicca 100 mg.. can't increase   4) Osteopenia: Bone Density 24 with most severe T-score -2.1 at spine, 6% decreased from 2yr ago -1.5 at femoral neck, 8% decreased... on no treatment.   FRAX 11%, 1.2% risk hip fracture- VFA not evaluated  Denies fractures, no previous treatment  5) HA: w/ dizziness better lately.. Small AN- 2 mm diameter, following by neuro..   TMJ ongoing pain, crepitus.. better with treatment above for SLE now... - working w/ Dr Cordova - MR brain 3/23 stable->  still stable  + for acoustic Schwannoma, asymptomatic not thought to be reason for HA - Confirmed TMJ and wearing mouth piece  now more consistent   6) Rash:  vesicular lesions diffuse oral when experienced triple+ infection COVID/Enterorhinovirus/RSV now resolved.. > 12 m later with no recurrence.   Age appropriate malignancy screening: colonoscopy > / endoscopy   neg (hemorrhoids small and diverticula)  , mammogram- GYN ordered update, pap smear 3 ys neg , CXR   Plan 25  - doing well, stable regimen at this point   - Give Tizanidine prescription, return to 10mg Cyclobenzaprine if no longer works- PRN   - Hold Lyrica to see if it improves mouth dryness- low dose continue as needed ..   - Give 1mo's worth of trial Cevimeline (use up to 3x per day- PRN) for mouth dryness, needs to try this   - meloxicam as needed 7.5 mg qd or BID..   - Follow-up in 4 m  -Is aware to call if there is any change in her underlying symptoms.

## 2025-02-18 NOTE — ASSESSMENT
[FreeTextEntry1] : Zenobia Whitney presents for follow-up. She underwent control of left epistaxis at last visit with silver nitrate cautery at last visit. She has not had further bleeding episodes. She notes chronic issues with rhinorrhea and rhinitis. Sinonasal endoscopy revealed septal deviation to left. Will start trial of ipratropium given lack of other allergy symptoms.  - start ipratropium 2 sprays to each nostril qd x 1 mo - f/u in 1 mo

## 2025-02-18 NOTE — HISTORY OF PRESENT ILLNESS
[de-identified] : Zenobia Whitney is a 63 year old female on hx of ITP aspirin who presents after ED evaluation of left sided epistasis. She had an unprompted left sided epistaxis that lasted for 2 hours which prompted ED evaluation at [facility] 1/14. She states that she had clots from nose yesterday. She denies nasal trauma. She denies lightheadedness, fatigue, or dyspnea. She denies nasal obstruction. She denies nasal congestion. She states she has chronic left sided clear rhinorrhea. She states she had sinus pressure on left side a few days before epistaxis. She has not had allergy testing in the past and denies flonase use. Has used Afrin as per ED.  ED notes reviewed. Previous labs reviewed. 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 [FreeTextEntry1] : 2/18/25 - Ms. Whitney presents for follow-up. She denies further epistaxis since last visit. she notes issues with chronic clear rhinorrhea. She denies nasal congestion or sinus pressure. She denies postnasal drainage. She denies history of recurrent sinusitis or allergy testing. No recent fevers.

## 2025-03-11 NOTE — HISTORY OF PRESENT ILLNESS
[Denies] : Denies [No] : No [Yes] : Yes [Declined] : Declined [Informed consent documented in EHR.] : Informed consent documented in EHR. [TB] : Tuberculosis screening [Hep acute panel] : Hepatitis acute panel [Total Hep B core AB] : total Hepatitis B Core antibody [Left upper extremity] : Left upper extremity [24g] : 24g [Start Time: ___] : Medication Start Time: [unfilled] [End Time: ___] : Medication End Time: [unfilled] [Medication Name: ___] : Medication Name: [unfilled] [Total Amount Administered: ___] : Total Amount Administered: [unfilled] [IV discontinued. Intact. No signs or symptoms of IV complications noted. Time: ___] : IV discontinued. Intact. No signs or symptoms of IV complications noted. Time: [unfilled] [Patient  instructed to seek medical attention with signs and symptoms of adverse effects] : Patient  instructed to seek medical attention with signs and symptoms of adverse effects [Patient left unit in no acute distress] : Patient left unit in no acute distress [Medications administered as ordered and tolerated well.] : Medications administered as ordered and tolerated well. [Blood drawn at time of visit] : Blood drawn at time of visit [de-identified] : 09:20am [de-identified] : NEXT INFUSION- 4/8/25 AT 09:30AM

## 2025-04-09 NOTE — PHYSICAL EXAM
[No Edema] : there was no peripheral edema [Soft] : abdomen soft [Non Tender] : non-tender [No Rash] : no rash [No Focal Deficits] : no focal deficits [Normal] : affect was normal and insight and judgment were intact

## 2025-04-10 NOTE — HISTORY OF PRESENT ILLNESS
[FreeTextEntry1] : ARVI TANG is a 63 year old female here for a physical exam. [de-identified] : Her last physical exam was last year  Vaccines: Tetanus is up to date, Tdap 2016 Pneumococcal vaccination is up to date Shingrix is up to date, has had one dose so far, next is scheduled in July  Her last dentist visit was less than one year ago  Her last eye doctor appointment was less than one year ago Her last dermatologist visit was years ago  GYN visit is up to date, Dr. Cordon Mammogram is up to date Colon cancer screening is up to date, colonoscopy 2/2/22, Dr. Hsu, repeat 5 years DEXA is up to date, 9/16/24  Her diet is healthy overall Exercise: rarely

## 2025-04-10 NOTE — PLAN
[FreeTextEntry1] : Continue all medications as prescribed.   Reviewed age-appropriate preventive screening tests with patient. She will discuss her possible reaction to the Shingrix vaccine with rheumatology but I recommended she still plan to have the second dose.  Discussed clean eating (eg Mediterranean style eating plan) and regular exercise/staying as physically active as possible.  Include balance exercises and strength training and core strengthening exercises for bone health and to decrease risk for falls.  Reviewed importance of good self care (e.g. meditation, yoga, adequate rest, regular exercise, magnesium, clean eating, etc.).  Follow up for next physical in one year.

## 2025-04-10 NOTE — ASSESSMENT
[Vaccines Reviewed] : Immunizations reviewed today. Please see immunization details in the vaccine log within the immunization flowsheet.  [FreeTextEntry1] : RAVI TANG is a 63 year old female here for a physical exam.  Patient has a history of hypercholesterolemia, hypothyroidism, lupus, seronegative RA, fibromyalgia, and osteopenia. She is seeing rheumatology for management of her autoimmune conditions and endocrinology (Dr. KHAN) for her hypothyroidism.   Labs were done prior. Her cholesterol is stable on Atorvastatin. Her TSH is stable on Levothyroxine. Her CBC is stable with a low WBC count. Her CMP is normal except for a borderline low protein level, though her albumin is normal.  Her 10 year ASCVD risk is calculated at 5.75%. Her EKG shows low voltage, similar to previous.  The patient was counseled for 15 minutes regarding the need for aggressive cardiovascular risk factor modification, including an effort to achieve an ideal body weight, limit salt intake, eat a heart-healthy diet, limit alcohol intake, and increase their physical activity to a level which is appropriate for their age and other medical conditions.  She had the Shingrix vaccine last week. About 5 days later she developed a low grade fever and LLQ abdominal pain. Her symptoms sound more like diverticulitis than a reaction to the vaccine. She did have diverticulosis on her last colonoscopy. Her symptoms are improving so there is no indication for antibiotics at this time.

## 2025-04-10 NOTE — HISTORY OF PRESENT ILLNESS
[FreeTextEntry1] : RAVI TANG is a 63 year old female here for a physical exam. [de-identified] : Her last physical exam was last year  Vaccines: Tetanus is up to date, Tdap 2016 Pneumococcal vaccination is up to date Shingrix is up to date, has had one dose so far, next is scheduled in July  Her last dentist visit was less than one year ago  Her last eye doctor appointment was less than one year ago Her last dermatologist visit was years ago  GYN visit is up to date, Dr. Cordon Mammogram is up to date Colon cancer screening is up to date, colonoscopy 2/2/22, Dr. Hsu, repeat 5 years DEXA is up to date, 9/16/24  Her diet is healthy overall Exercise: rarely

## 2025-04-10 NOTE — HEALTH RISK ASSESSMENT
[0] : 2) Feeling down, depressed, or hopeless: Not at all (0) [PHQ-2 Negative - No further assessment needed] : PHQ-2 Negative - No further assessment needed [Never] : Never [[id number]] : 0 [Former] : Former [5-9] : 5-9 [< 15 Years] : < 15 Years

## 2025-04-10 NOTE — HEALTH RISK ASSESSMENT
[0] : 2) Feeling down, depressed, or hopeless: Not at all (0) [PHQ-2 Negative - No further assessment needed] : PHQ-2 Negative - No further assessment needed [Never] : Never [[id number]] : 0 [Former] : Former [5-9] : 5-9 [< 15 Years] : < 15 Years

## 2025-04-15 NOTE — HISTORY OF PRESENT ILLNESS
[Denies] : Denies [No] : No [Yes] : Yes [Declined] : Declined [TB] : Tuberculosis screening [Hep acute panel] : Hepatitis acute panel [Right upper extremity] : Right upper extremity [22g] : 22g [Start Time: ___] : Medication Start Time: [unfilled] [End Time: ___] : Medication End Time: [unfilled] [Medication Name: ___] : Medication Name: [unfilled] [Total Amount Administered: ___] : Total Amount Administered: [unfilled] [IV discontinued. Intact. No signs or symptoms of IV complications noted. Time: ___] : IV discontinued. Intact. No signs or symptoms of IV complications noted. Time: [unfilled] [Patient  instructed to seek medical attention with signs and symptoms of adverse effects] : Patient  instructed to seek medical attention with signs and symptoms of adverse effects [Patient left unit in no acute distress] : Patient left unit in no acute distress [Medications administered as ordered and tolerated well.] : Medications administered as ordered and tolerated well. [de-identified] : AC [de-identified] : [id number] [de-identified] :  Dr. Bibiana Daniels was the supervising provider

## 2025-04-30 NOTE — PLAN
[FreeTextEntry1] : - rest, hydration  - trial Meclizine as never tried it  - if worsening symptoms, accompanied with chest pain, SOB - go to the ER

## 2025-04-30 NOTE — PHYSICAL EXAM
[Normal] : affect was normal and insight and judgment were intact [de-identified] : congestion noted bilaterally

## 2025-05-06 NOTE — REVIEW OF SYSTEMS
[Feeling Poorly] : feeling poorly [Feeling Tired] : feeling tired [Recent Weight Loss (___ Lbs)] : recent [unfilled] ~Ulb weight loss [Chest Pain] : chest pain [Heartburn] : heartburn [Arthralgias] : arthralgias [Joint Pain] : joint pain [Joint Swelling] : joint swelling [Joint Stiffness] : joint stiffness [Sleep Disturbances] : sleep disturbances [Anxiety] : anxiety [Depression] : depression [Negative] : Heme/Lymph [Wheezing] : wheezing [Cough] : cough [As Noted in HPI] : as noted in HPI [Dizziness] : dizziness [Fever] : no fever [Chills] : no chills [Dry Eyes] : no dryness of the eyes [Lower Ext Edema] : no lower extremity edema [Shortness Of Breath] : no shortness of breath [Skin Lesions] : no skin lesions [Itching] : no itching [FreeTextEntry2] : marked improvement in energy [FreeTextEntry4] : Dry mouth r/t lyrica rx 2-3 tabs (q hs) with mouth guard routinely for bruxism- notes irritation cheilosis BL  [FreeTextEntry5] : Non-reproducible chest tightness, hx GERD on omeprazole. Previous: chest pain . L sided cw pain etiology unclear- intermittently pleuritic w/ hx pleural effusions- still painful.. routinely.- mild now [FreeTextEntry6] : Productive yellow cough, wheezing. Denies SOB [FreeTextEntry7] :  On omeprazole daily. PREVIOUS VISIT: severe GERD and diarrhea w/ leflunomide- better OFF .   [FreeTextEntry8] : Slight increase in pro/cr ratio 0.3 since 11/23-> 3/24, asymptomatic- stable updated studies needed  [FreeTextEntry9] : TMJ. Excellent lately... in past Intermittent b/l hand swelling, neuropathy and soreness, b/l shoulder pain. PREVIOUS VISIT: still getting intermittent swelling in hands and "periodic joint pain"- most severe in bl shoulder L>R.   [de-identified] : PREVIOUS VISIT: Itchy, blistering lesions; photosensitive arthralgias but no rash.- RESOLVED  [de-identified] : Vertigo past few weeks. Mild intermittent paresthesias nothing today -STILL- minimal lately [de-identified] : Feels A/D better controlled on wellbutrin 300mg daily and improved w/ better sleep on current regimen [de-identified] : Stil on levothyroxine 150mg endo following TSH [id number], no recent TSH level in chart but pt report nl [de-identified] : RESOLVED -Lip swelling

## 2025-05-06 NOTE — ASSESSMENT
[FreeTextEntry1] : Patient is a 62yo female who presents to the office complaining of 2 weeks URI symptoms, unsteadiness/vertigo type symptoms.  Feels cough is improving.  Had CXR earlier today which was negative for PNA.  Pt has some wheezing on exam.  Prescribed Doxycycline by Rheumatology earlier today.  Sent to our office to test for Flu/COVID.  Has MRI scheduled tomorrow for evaluation of vertigo symptoms.  Recommended pt proceed with Doxycycline as prescribed by Rheumatology. COVID/Flu/RSV swab sent to lab. RX for Albuterol every 4-6 hours PRN sent to pharmacy.  Proceed with MRI as scheduled. Take Meclizine every 8 hours as needed. F/u Neurology +/- ENT PRN.  Pt will monitor symptoms over the next few days.  If no improvement/worsening, will start Steroid taper (has Prednisone 5mg tablets, will take 20mg x2 days, 15mg x2 days, 10mg x2 days, 5mg x2 days).  Emphysematous changes on CXR.  Pt informed of results.  In general, pt asymptomatic from lung standpoint.  Advised Pulmonology f/u PRN.  Call the office or go to the ED immediately if you develop new, worsening or concerning symptoms including high fever, severe headache/worst headache of your life, confusion, dizziness/lightheadedness, loss of consciousness, severe chest pain, difficulty breathing, shortness of breath, severe abdominal pain, excessive vomiting/diarrhea, inability to feel/move the extremities, or any other concerning symptoms.

## 2025-05-06 NOTE — HISTORY OF PRESENT ILLNESS
[FreeTextEntry8] : Pt reports URI symptoms for the past 2 weeks. Reports NC, PND, cough. Cough is productive of yellow phlegm. Has been having sweats but no recorded fevers. Has tried Robitussin for symptoms with some improvement of symptoms. Feels cough has improved since last week, feels she may be on the mend.  Seen by Rheum, Dr. Guerrero, earlier today. Had CXR which showed emphysematous changes otherwise negative.  No PNA. Pt prescribed Doxycycline 100mg BID x10 days by Dr. Guerrero which she has not started yet.  Reports vertigo symptoms since onset of URI symptoms. Has had vertigo in the past, has been trying Eply maneuver without improvement. +tinnitus in the right ear.  New with onset of vertigo symptoms. Denies falls, head trauma or LOC. Denies vision changes, difficulty speaking/swallowing, or numbness/tingling/weakness of the arms/legs (has chronic tingling in bilateral fingertips which is unchanged). 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 Pt has MRI scheduled tomorrow, ordered by Dr. Guerrero. Neurology:  Dr. Cordova.

## 2025-05-06 NOTE — PHYSICAL EXAM
[General Appearance - Alert] : alert [General Appearance - In No Acute Distress] : in no acute distress [General Appearance - Well Nourished] : well nourished [General Appearance - Well Developed] : well developed [General Appearance - Well-Appearing] : healthy appearing [Sclera] : the sclera and conjunctiva were normal [Outer Ear] : the ears and nose were normal in appearance [Oropharynx] : the oropharynx was normal [] : the neck was supple [Heart Rate And Rhythm] : heart rate was normal and rhythm regular [Heart Sounds] : normal S1 and S2 [Heart Sounds Gallop] : no gallops [Murmurs] : no murmurs [Heart Sounds Pericardial Friction Rub] : no pericardial rub [Edema] : there was no peripheral edema [Cervical Lymph Nodes Enlarged Posterior Bilaterally] : posterior cervical [Cervical Lymph Nodes Enlarged Anterior Bilaterally] : anterior cervical [Supraclavicular Lymph Nodes Enlarged Bilaterally] : supraclavicular [Sensation] : the sensory exam was normal to light touch and pinprick [Motor Exam] : the motor exam was normal [No Focal Deficits] : no focal deficits [Oriented To Time, Place, And Person] : oriented to person, place, and time [Impaired Insight] : insight and judgment were intact [Affect] : the affect was normal [Abnormal Walk] : normal gait [Nail Clubbing] : no clubbing  or cyanosis of the fingernails [Musculoskeletal - Swelling] : no joint swelling seen [Motor Tone] : muscle strength and tone were normal [FreeTextEntry1] : Today no obvious synovitis;  Previous: Minimal swelling L 3rd MCP w/ mild ttp and L shoulder mild POM/ TTT but fROM

## 2025-05-06 NOTE — REVIEW OF SYSTEMS
[Fever] : no fever [Chills] : no chills [Fatigue] : fatigue [Earache] : no earache [Hearing Loss] : no hearing loss [Nasal Discharge] : nasal discharge [Sore Throat] : no sore throat [Postnasal Drip] : postnasal drip [Shortness Of Breath] : no shortness of breath [Wheezing] : wheezing [Cough] : cough [Headache] : no headache [Dizziness] : dizziness [Fainting] : no fainting [Confusion] : no confusion [Memory Loss] : no memory loss [Unsteady Walking] : no ataxia [Negative] : Heme/Lymph [FreeTextEntry4] : tinnitus

## 2025-05-06 NOTE — PHYSICAL EXAM
[Normal Outer Ear/Nose] : the outer ears and nose were normal in appearance [No Respiratory Distress] : no respiratory distress  [No Accessory Muscle Use] : no accessory muscle use [No Edema] : there was no peripheral edema [Normal] : no rash [Coordination Grossly Intact] : coordination grossly intact [No Focal Deficits] : no focal deficits [Normal Gait] : normal gait [Normal Affect] : the affect was normal [Normal Insight/Judgement] : insight and judgment were intact [de-identified] : fluid behind bilateral TMs; nasal mucosa edematous/erythematous with cloudy drainage; PND, mild erythema, no exudates. [de-identified] : exp wheezing predominantly lower lung fields.

## 2025-05-06 NOTE — ADDENDUM
[FreeTextEntry1] : CXR today negative for acute consolidation. Seen by PCP- dx bronchitis and started on doxy..  If sx don't resolve within next 2 wks will need to get HRCT- notified patient

## 2025-05-06 NOTE — ASSESSMENT
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 resolved w/ no sequelae.. actually all pain resolved for 2-3 wks following this infection and last infection few ys ago. Presents today reassessment Orencia infusion new dose 500mg 24 -Started Orencia  but allergic reactions.. eventually switched..  - started Benlysta   -  acute epistaxis..   1) SLE w/ prominent symmetrical synovitis: : + DILLON 1:320, low + dsDNA > 1000, low complements (recently nl), + CAVi 39 (early Sjogrens) + Chromatin 4.0, antihistone 4.1, + LAC by DRVVT and ACL IGM > 150, B2G IGM > 150, phosphatidyl serine IGM 52.. (no reported bleeding/ clotting dyscrasia) but caution NSAIDs w/ neg RF/ CCP and all other subserologies to include ACE/ ANCA/ PR3/MPO.. - Presented at age 28 with severe joint symmetrical inflammation, RP, lymphadenopathy (mild intermittent), sicca ITP (severe x 2 episodes), hypercoagulability (dx 2018), hair thinning, and proteinuria..  - Recurrent synovitis (symmetrical/ asymmetrical)/ enthesopathies (asymmetrical)- severe pain/ swelling still b/l hands R wrist considerably when active Finally for first time in over 12 m well controlled w/ no synovitis peripherally, TMJ better w/  mg (5.3 mg/kg- levels 1002- therapeutic is over 1000) x many ys. and on Benlysta infusions since .. doing VERY well today.  Notes some "tightness"  in throat 12-24 hs after infusion but no hoarsenss/ / cough- resolved now, tolerating infusions better but because of these reactions, still getting solumedrol 40 mg before monthly infusions.. always feels better .. no longer requiring po steroids following infusions.  Still with CW pain/ pleuritic in nature, improves with steroids- has not recently tried NSAIDs- encouraged to consider meloxicam 7.5 mg daily- 2nd dose if needed.. after failing to improve with colchicine (not effective or tolerated) Other options: Could also consider Saphnelo, RTX but only if no improvement Benlysta.   If need to add additional therapy would be cautious with AZA given chronic leukopenia) NOTE: - Orencia autoinjector +response but not covered by insurance- though well tolerated and effective but not covered - infusions associated with strange infusion reactions.. Orencia  - Chronic atypical CP:  echo  trace pericardial effusion as noted on CTscan (prior to starting Orencia) CTscan chest  + small pericardial effusion, pericardial thickening and small loculated L pleural effusion/ thickening; Colchicine at any dose not effective and not tolerated! Severe GI distress upper / lower - In recent past tried both MTX/ and LEF didn't tolerate either.. severe GI distress, and MTX associated w/ oral sicca possibly (per pt) - meloxicam tolerated in past.. ibuprofen tolerated- both.. other NSAIDS not tolerated well  - Seen by Dr Hightower and research team  but not eligible 2/2 hx thyroid ca - Intermittent need for steroids.. always w/ + response... but doesn't like how feels, only really tolerates 5 mg daily.. (10 mg temp insensitivity, trouble sleeping, restless) - MTX and LEF associated iwth severe GI distress. oral therapy. 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 No bleeding / clotting dyscrasias. No miscarriage  (c sections)- chronic leukopenia - No hx splenomegaly despite hx ITP.. (US this year - "sludgy).. no formal gallstones - Knee pain chronically with OA noted on imaging moderate DJD  2) AI thyroid disease, thyroid cancer s/p thyroidectomy: Hypothyroidism on replacement TFTs intermittently elevated TSH.. on replacement recently adjusted dose when TSH 15 ()... Working w/ Endo (Dr KHAN) reportedly updated study nl  3) FM, secondary: Dx > 10 ys ago. Doing well on current regimen of wellbutrin 300 mg and better sleep with Lyrica 100 mg at HS and cyclobenzaprine 10 mg every night.  If continues to do well, will try to lower pharm burden slowly.. as tolerated.   - Sleep/TMJ: Now more consistent use Flexeril 10mg, lyrica  mg (usually 50 mg) at HS and MM w/ marked  improvement in sleep. Very c/w mouth guard- but notes dryness and irritation cw/ angular cheilosis (now resolved) but sicca persists.  Didn't try Cevimiline, will do so now reminder PRN dosing.. this is NOT necessary 3 x day.  Continues w/ B complex supplements as well along with topical sialogogues..   - Myalgias/Weakness: marked improvement with better control of SLE, no obvious trigger points today - Paresthesias: intermittent, improved with Lyrica low dose  - IBS-D (last colonoscopy > 5 ys ago- next wk), mild GERD initially, worse with MTX and even worse w/ LEF and colchicine, better now off all these. Not active at this time- and remains off all of these agents  - Mood: marked improvement with control of underlying condition. NOTE Celexa in past x 6 m not effective Ambien: effective but logistical issues Naproxen / Feldene: not tolerated severe memory issues Gabapentin in past can't remember efficacy or SE/ Lyrica tolerated at low doses but severe sicca 100 mg.. can't increase   4) Osteopenia: Bone Density 24 with most severe T-score -2.1 at spine, 6% decreased from 2yr ago -1.5 at femoral neck, 8% decreased... on no treatment.   FRAX 11%, 1.2% risk hip fracture- VFA not evaluated  Denies fractures, no previous treatment  5) HA: w/ dizziness better lately.. Small AN- 2 mm diameter, following by neuro..   TMJ ongoing pain, crepitus.. better with treatment above for SLE now... - working w/ Dr Cordova - MR brain 3/23 stable->  still stable  + for acoustic Schwannoma, asymptomatic not thought to be reason for HA - Confirmed TMJ and wearing mouth piece  now more consistent   6) Rash:  vesicular lesions diffuse oral when experienced triple+ infection COVID/Enterorhinovirus/RSV now resolved.. > 12 m later with no recurrence.   Age appropriate malignancy screening: colonoscopy > / endoscopy   neg (hemorrhoids small and diverticula)  , mammogram- GYN ordered update, pap smear 3 ys neg , CXR  Plan 25: - Try 1-2x 100mg Celebrex daily instead of Meloxicam - Take 10mg Cyclobenzaprine nightly now - Order stat chest X-ray, afterwards CT if necessary - Prescribe antibiotic for lungs - Test for covid - Report back after visiting vestibular specialist - Cancel next infusion - Follow-up in 2w  Plan 25  - doing well, stable regimen at this point   - Give Tizanidine prescription, return to 10mg Cyclobenzaprine if no longer works- PRN   - Hold Lyrica to see if it improves mouth dryness- low dose continue as needed ..   - Give 1mo's worth of trial Cevimeline (use up to 3x per day- PRN) for mouth dryness, needs to try this   - meloxicam as needed 7.5 mg qd or BID..   - Follow-up in 4 m  -Is aware to call if there is any change in her underlying symptoms.

## 2025-05-20 NOTE — HISTORY OF PRESENT ILLNESS
[8] : 8 [Denies] : Denies [No] : No [Yes] : Yes [Declined] : Declined [Informed consent documented in EHR.] : Informed consent documented in EHR. [TB] : Tuberculosis screening [Hep acute panel] : Hepatitis acute panel [Total Hep B core AB] : total Hepatitis B Core antibody [de-identified] : LOWER AND MID LEFT BACK PAIN [de-identified] :  Dr. Bibiana Daniels was the supervising provider for this infusion. [Left upper extremity] : Left upper extremity [24g] : 24g [Start Time: ___] : Medication Start Time: [unfilled] [End Time: ___] : Medication End Time: [unfilled] [Medication Name: ___] : Medication Name: [unfilled] [Total Amount Administered: ___] : Total Amount Administered: [unfilled] [IV discontinued. Intact. No signs or symptoms of IV complications noted. Time: ___] : IV discontinued. Intact. No signs or symptoms of IV complications noted. Time: [unfilled] [Patient  instructed to seek medical attention with signs and symptoms of adverse effects] : Patient  instructed to seek medical attention with signs and symptoms of adverse effects [Patient left unit in no acute distress] : Patient left unit in no acute distress [Medications administered as ordered and tolerated well.] : Medications administered as ordered and tolerated well. [Blood drawn at time of visit] : Blood drawn at time of visit [de-identified] : 09:05AM

## 2025-06-24 NOTE — HISTORY OF PRESENT ILLNESS
[FreeTextEntry1] : 6/24/25  Did not get CTscan chest.. Recent URI  Held infusion 2 wk 2/2 URI sx.. now back on...  Did get shingles vaccine..  Infusions..  Vertigo? better with resolution of sinus issue   GERD resolved..  Rare use of cyclobenzaprine usually gabriel 5 mg needed ++ response few times/ wk...  Pregabalin now only PRN... 1-2 when needed.. notes improved cognition and dry mouth..  .mild intermittent joint pain now at R 5 PIP only..    1) SLE/ Inflammatory arthritis- RA dx years ago- seronegative 2) AI Thyroid disease 3) Secondary FM 4) Osteopenia ____________________________________________________________________________  Initial HPI 1/26/22 59 yo  Dx with SLE 30 ys ago.. presented at 28 with severe joint symmetrical inflammation x 6 m initially seen by Dr. Mock tx w/  mg x many ys (intermittently stops few mnths.. then recurrence)  Presented with widespread lymphadenopathy.. intermittently with flares... ?Protein initially  Raynauds x many ys  Intermittent need for steroids.. 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 No bleeding / clotting dyscrasias  Updated Labs: 1/22 low + DILLON 1:320H/N w/ low + dsDNA 13, C3 81 in past nl nl / C4 nl neg CCP...  TFTs intermittently elevated on replacement   Dry eyes natural once daily / mouth- worse w/ medication Night sweats:  drenching recently past few wks.. no lymphadenopathy but  Hair thinning- did ys ago patch alopecia.. Joints:  recurrent spontaneous joint inflammation in symmetrical (classic RA pattern - polyarticular severe) and asymmetrical pattern intraarticular synovitis and diffuse pauciarticular enthesopathies ( recurrent plantar fascitis, bursitis shoulders, lateral epicondylitis and patella tendinopathy)..  Mild pleuritic R sided x 15 intermittent.. pleural effusion.. more uncomfortable when flaring.. (chronic) No hx splenomegaly despite hx ITP.. (US this year - "sludgy).. no formal gallstones No renal dysfunction and nl UA no recurrent UTIs.. no hx protein Rash:  mild + photosensitive joint pain NO rash, no malar lesions or other.  Paresthesias:  fingers any time of 1st 2 fingers / and ball of foot..   Neck pain and stiffness intermittently.. w/ limited ROM intermittent  NO hx of inflammatory eye/ bowel disease  Knee pain chronically with OA noted on imaging moderate DJD    Secondary FM  IBS-D (last colonoscopy > 5 ys ago- next wk), mild GERD..   Depression: better, nearly nl and "happy", had been irritability currently well tried  Celexa in past x 6   Sleep: APAP PM.. Ambien in past .. rarely stays asleep 2/2 pain.. 3-4 hs rarely restorative..   Thyroid ca:  and AI thyroid disease, multinodular lesions, removed total resection and radiation x 1 .. now on titrating doses.  Working w/ Endo (Dr. KHAN).   Age appropriate malignancy screening:   colonoscopy > 5 ys / endoscopy -ys ago , mammogram- annually ok, pap smear 3 ys neg , CXR   c/o dry eyes, back pain, and N/T, hypothyroidism TSH 5.55 1/22  referred for + DILLON 1:320 H/R w/ low + dsDNA 13 and low C 3 x 1 81 (now nl) neg SSA/B, STAN no RF/  neg CCP, Hep B/C   + FH  IBD UC mother Thyroid dz

## 2025-06-24 NOTE — PHYSICAL EXAM
[General Appearance - Alert] : alert [General Appearance - In No Acute Distress] : in no acute distress [General Appearance - Well Nourished] : well nourished [General Appearance - Well Developed] : well developed [General Appearance - Well-Appearing] : healthy appearing [Sclera] : the sclera and conjunctiva were normal [Outer Ear] : the ears and nose were normal in appearance [Oropharynx] : the oropharynx was normal [Heart Rate And Rhythm] : heart rate was normal and rhythm regular [Heart Sounds] : normal S1 and S2 [Heart Sounds Gallop] : no gallops [Murmurs] : no murmurs [Heart Sounds Pericardial Friction Rub] : no pericardial rub [Edema] : there was no peripheral edema [Cervical Lymph Nodes Enlarged Posterior Bilaterally] : posterior cervical [Cervical Lymph Nodes Enlarged Anterior Bilaterally] : anterior cervical [Supraclavicular Lymph Nodes Enlarged Bilaterally] : supraclavicular [Sensation] : the sensory exam was normal to light touch and pinprick [No Focal Deficits] : no focal deficits [Motor Exam] : the motor exam was normal [Oriented To Time, Place, And Person] : oriented to person, place, and time [Impaired Insight] : insight and judgment were intact [Affect] : the affect was normal [Abnormal Walk] : normal gait [Nail Clubbing] : no clubbing  or cyanosis of the fingernails [Musculoskeletal - Swelling] : no joint swelling seen [Motor Tone] : muscle strength and tone were normal [] : no respiratory distress [Respiration, Rhythm And Depth] : normal respiratory rhythm and effort [Auscultation Breath Sounds / Voice Sounds] : lungs were clear to auscultation bilaterally [FreeTextEntry1] : Today R 4 PIP boggy and TTP no redness otherwise  no obvious synovitis;  Previous: Minimal swelling L 3rd MCP w/ mild ttp and L shoulder mild POM/ TTT but fROM

## 2025-06-24 NOTE — ASSESSMENT
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 resolved w/ no sequelae.. actually all pain resolved for 2-3 wks following this infection and last infection few ys ago. Presents today reassessment Orencia infusion new dose 500mg 24 -Started Orencia  but allergic reactions.. eventually switched..  - started Benlysta   -  acute epistaxis.. resolved -  severe URI w/ sinus infection/ vertigo.. resolved with doxy  1) SLE w/ prominent symmetrical synovitis: : + DILLON 1:320, low + dsDNA > 1000, low complements (recently nl), + CAVi 39 (early Sjogrens) + Chromatin 4.0, antihistone 4.1, + LAC by DRVVT and ACL IGM > 150, B2G IGM > 150, phosphatidyl serine IGM 52.. (no reported bleeding/ clotting dyscrasia) but caution NSAIDs w/ neg RF/ CCP and all other subserologies to include ACE/ ANCA/ PR3/MPO.. - Presented at age 28 with severe joint symmetrical inflammation, chronic leukopenia, RP, lymphadenopathy (mild intermittent), sicca ITP (severe x 2 episodes), hypercoagulability (dx 2018), hair thinning, and proteinuria..  - Recurrent synovitis (symmetrical/ asymmetrical)/ enthesopathies (asymmetrical)- severe pain/ swelling still b/l hands R wrist considerably when active and with AMS x several hours-> 2 hs (well controlled) Finally for first time in over 12 m well controlled w/ no synovitis peripherally (except R 5 PIPP, TMJ better w/  mg (5.3 mg/kg- levels 1002- therapeutic is over 1000) x many ys. and on Benlysta infusions since .. doing fairly well today back on steroids before and after infusion for better tolerance.   Full resolution of recent infection....  - CW pain/ pleuritic in nature- intermittent- mild, improves with steroids-much better with daily celebrex 100 mg now.. doing better and tolerated better than meloxicam.  Other options: Could also consider switch to Saphnelo, RTX but only if no improvement Benlysta.   If need to add additional therapy would be cautious with AZA given chronic leukopenia) NOTE: - Orencia autoinjector +response but not covered by insurance- though well tolerated and effective but not covered - infusions associated with strange infusion reactions.. Orencia  - Chronic atypical CP:  echo  trace pericardial effusion as noted on CTscan (prior to starting Orencia) CTscan chest  + small pericardial effusion, pericardial thickening and small loculated L pleural effusion/ thickening; Colchicine at any dose not effective and not tolerated! Severe GI distress upper / lower - In recent past tried both MTX/ and LEF didn't tolerate either.. severe GI distress, and MTX associated w/ oral sicca possibly (per pt) - meloxicam tolerated in past.. ibuprofen tolerated- both.. other NSAIDS not tolerated well  - Seen by Dr Hightower and research team  but not eligible 2/2 hx thyroid ca - Intermittent need for steroids.. always w/ + response... but doesn't like how feels, only really tolerates 5 mg daily.. (10 mg temp insensitivity, trouble sleeping, restless) - MTX and LEF associated iwth severe GI distress. oral therapy. 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 No bleeding / clotting dyscrasias. No miscarriage  (c sections)- chronic leukopenia - No hx splenomegaly despite hx ITP.. (US this year - "sludgy).. no formal gallstones - Knee pain chronically with OA noted on imaging moderate DJD  2) AI thyroid disease, thyroid cancer s/p thyroidectomy: Hypothyroidism on replacement TFTs intermittently elevated TSH.. on replacement recently adjusted dose when TSH 15 ()... Working w/ Endo (Dr KHAN) reportedly updated study nl  3) FM, secondary: Dx > 10 ys ago. Doing well on current regimen of wellbutrin 300 mg and better sleep with Lyrica 100 mg at HS and cyclobenzaprine 10 mg every night-> now both PRN and doing well.. .  If continues to do well, will try to lower pharm burden slowly.. as tolerated.   - Sleep/TMJ: PRN use as noted and MM w/ marked  improvement in sleep. Very c/w mouth guard- but notes dryness and irritation (worse when using cyclobenzaprine) cw/ angular cheilosis (now resolved) but sicca persists.  Didn't try Cevimiline- better without taking pregabalin every day..Continues w/ B complex supplements as well along with topical sialogogues..   - Myalgias/Weakness: marked improvement with better control of SLE, no obvious trigger points today - Paresthesias: intermittent, improved with Lyrica low dose PRN now - IBS-D (last colonoscopy > 5 ys ago- next wk), mild GERD initially, worse with MTX and even worse w/ LEF and colchicine, better now off all these. Not active at this time- and remains off all of these agents  - Mood: marked improvement with control of underlying condition. NOTE Celexa in past x 6 m not effective Ambien: effective but logistical issues Naproxen / Feldene: not tolerated severe memory issues.  Meloxicam GID.. will try Celebrex Gabapentin in past can't remember efficacy or SE/ Lyrica tolerated at low doses but severe sicca 100 mg.. can't increase   4) Osteopenia: Bone Density 24 with most severe T-score -2.1 at spine, 6% decreased from 2yr ago -1.5 at femoral neck, 8% decreased... on no treatment.   FRAX 11%, 1.2% risk hip fracture- VFA not evaluated  Denies fractures, no previous treatment  5) HA: w/ dizziness better lately.. Small AN- 2 mm diameter, following by neuro.. with increase in Vertigo lately (but acute URI...)  TMJ ongoing pain, crepitus.. better with treatment above for SLE now... - working w/ Dr Cordova - MR brain 3/23 stable-> ->   still stable  + for acoustic Schwannoma, asymptomatic not thought to be reason for HA or increase in vertigo lately - Confirmed TMJ and wearing mouth piece  now more consistent   6) Rash:  vesicular lesions diffuse oral when experienced triple+ infection COVID/Enterorhinovirus/RSV now resolved.. > 12 m later with no recurrence.   Age appropriate malignancy screening: colonoscopy > / endoscopy   neg (hemorrhoids small and diverticula)  , mammogram- GYN ordered update, pap smear 3 ys neg , CXR  negative  Plan 25 - Continue daily  100mg Celebrex daily instead of Meloxicam see if better tolerated - better tolerated, ok to increase to 200 mg if needed PRN - PRN 5- 10mg Cyclobenzaprine  - PRN pregabalin (better tolerated- dry mouth resolved and cognition more clear), pain totally manageable - Continue current regimen.. - PPD placed for persistently indeterminant QFT- CXR  was negative. No need for CTscan at this time..  - monitor UPCR.. may need to consider renal bx if levels > 500 persistently..  - PRN meclizine, no need for further eval now (felt to be r/t URI) - if returns may need to reconsider repeat MR brain - RPA 4 m..  -Is aware to call if there is any change in her underlying symptoms.

## 2025-06-24 NOTE — REVIEW OF SYSTEMS
[Feeling Poorly] : feeling poorly [Feeling Tired] : feeling tired [Recent Weight Loss (___ Lbs)] : recent [unfilled] ~Ulb weight loss [Chest Pain] : chest pain [Wheezing] : wheezing [Cough] : cough [Heartburn] : heartburn [Arthralgias] : arthralgias [Joint Pain] : joint pain [Joint Swelling] : joint swelling [Joint Stiffness] : joint stiffness [As Noted in HPI] : as noted in HPI [Dizziness] : dizziness [Sleep Disturbances] : sleep disturbances [Anxiety] : anxiety [Depression] : depression [Negative] : Heme/Lymph [Fever] : no fever [Chills] : no chills [Dry Eyes] : no dryness of the eyes [Lower Ext Edema] : no lower extremity edema [Shortness Of Breath] : no shortness of breath [Skin Lesions] : no skin lesions [Itching] : no itching [FreeTextEntry2] : marked improvement in energy- feels great lately [FreeTextEntry4] : Dry mouth r/t lyrica rx 2-3 tabs (q hs)- RESOLVED on PRN dose..  with mouth guard routinely for bruxism- notes irritation cheilosis BL  [FreeTextEntry5] : Non-reproducible chest tightness- RESOLVED, hx GERD on omeprazole. Previous: chest pain . L sided cw pain etiology unclear- intermittently pleuritic w/ hx pleural effusions- still painful.. routinely.- mild now [FreeTextEntry6] : Productive yellow cough, wheezing. Denies SOB- RESOLVED  [FreeTextEntry7] :  On omeprazole daily. PREVIOUS VISIT: severe GERD and diarrhea w/ leflunomide- better OFF .   [FreeTextEntry8] : Slight increase in pro/cr ratio 0.3 -0.6 since 11/23->  6/25, asymptomatic- stable updated studies needed  [FreeTextEntry9] : TMJ. Excellent lately... in past Intermittent b/l hand swelling, neuropathy and soreness, b/l shoulder pain. PREVIOUS VISIT: still getting intermittent swelling in hands and "periodic joint pain"- most severe in bl shoulder L>R.   [de-identified] : PREVIOUS VISIT: Itchy, blistering lesions- RESOLVED; photosensitive arthralgias but no rash.- RESOLVED  [de-identified] : Vertigo past few weeks- MINIMAL. Mild intermittent paresthesias nothing today -STILL- minimal lately- PRN use pregabalin now [de-identified] : Feels A/D better controlled on wellbutrin 300mg daily and improved w/ better sleep on current regimen [de-identified] : Stil on levothyroxine 150mg endo following TSH i7chnmm, no recent TSH level in chart but pt report nl [de-identified] : RESOLVED -Lip swelling

## 2025-06-24 NOTE — PROCEDURE
[Today's Date:] : Date: [unfilled] [Risks] : risks [Benefits] : benefits [Alternatives] : alternatives [Consent Obtained] : written consent was obtained prior to the procedure and is detailed in the patient's record [Patient] : Prior to the start of the procedure a time out was taken and the identity of the patient was confirmed via name and date of birth with the patient. The correct site and the procedure to be performed were confirmed. The correct side was confirmed if applicable. The availability of the correct equipment was verified [Diagnostic] : diagnostic  [#1 Site: ______] : #1 site identified in the [unfilled] [Alcohol] : alcohol [25 gauge 5/8  inch] : A 25 gauge 5/8 inch needle was used [Tolerated Well] : the patient tolerated the procedure well [Instructions Given] : handouts/patient instructions were given to patient [de-identified] : PPD placement -monitor for reaction, report to RN team 48-72 hs for reading.

## 2025-07-15 NOTE — HISTORY OF PRESENT ILLNESS
[Denies] : Denies [No] : No [Yes] : Yes [Declined] : Declined [Informed consent documented in EHR.] : Informed consent documented in EHR. [TB] : Tuberculosis screening [Hep acute panel] : Hepatitis acute panel [Total Hep B core AB] : total Hepatitis B Core antibody [Left upper extremity] : Left upper extremity [24g] : 24g [Start Time: ___] : Medication Start Time: [unfilled] [End Time: ___] : Medication End Time: [unfilled] [Medication Name: ___] : Medication Name: [unfilled] [Total Amount Administered: ___] : Total Amount Administered: [unfilled] [IV discontinued. Intact. No signs or symptoms of IV complications noted. Time: ___] : IV discontinued. Intact. No signs or symptoms of IV complications noted. Time: [unfilled] [Patient  instructed to seek medical attention with signs and symptoms of adverse effects] : Patient  instructed to seek medical attention with signs and symptoms of adverse effects [Patient left unit in no acute distress] : Patient left unit in no acute distress [Medications administered as ordered and tolerated well.] : Medications administered as ordered and tolerated well. [de-identified] : Started IV in the L[facility] [de-identified] : 9:10 AM

## 2025-07-24 NOTE — ASSESSMENT
[FreeTextEntry1] : #  #Seborrheic Keratosis - These growths are benign - Related to genetics - these lesions run in families; NOT related to sun exposure - No treatment warranted unless inflamed; can use OTC Sarna lotion PRN itch  #  Multiple melanocytic nevi, benign - Monitor moles for changes - Recommend wearing hats and sun protective clothing or OTC sunscreen products (SPF 30+, broad band, EltaMD/La Roche Posay/Neutrogena) daily on your face and entire body (apply sunscreen atleast 30 minutes prior to going outside). Reapply sunscreen every 2 hours when outside.  #  Screening exam for skin cancer- TBSE performed today - Advised sun protection. Recommended OTC sunscreen products (EltaMD/Neutrogena/La Roche Posay), including SPF30+ with broadband UV protection as well as proper use. Discussed OTC sun protective clothing - Counseled patient to monitor for changes, including mole monitoring and self-skin exams.  RTC 1 year or sooner PRN

## 2025-07-24 NOTE — HISTORY OF PRESENT ILLNESS
[FreeTextEntry1] : TBSE [de-identified] : 64yo F here for:   #TBSE- no new or concerning spots, denies any changes   Skin Cancer Hx: denies Family Hx: denies hx skin cancer

## 2025-07-24 NOTE — PHYSICAL EXAM
[Alert] : alert [Oriented x 3] : ~L oriented x 3 [Well Nourished] : well nourished [Full Body Skin Exam Performed] : performed [FreeTextEntry3] :  alert,  oriented x 3, well nourished, conjunctiva non-injected, no visual lymphadenopathy, no clubbing, no edema, no bromhidrosis and no chromhidrosis.   Full body skin exam was performed.   General: Alert and oriented, in NAD. All of the following were examined and were within normal limits, except as noted: Scalp: Face, including eyelids, nose, lips, ears, oropharynx: Neck: Chest/Back/Abdomen: Bilateral Arms/Hands: Bilateral Legs/Feet: Buttocks, Genitalia, Anus/perineum:   Hair, Nails, Oral Mucosa, Eyes: - benign nevi  - lentigines  - hyperpigmented stuck on plaques on trunk